# Patient Record
Sex: FEMALE | Race: WHITE
[De-identification: names, ages, dates, MRNs, and addresses within clinical notes are randomized per-mention and may not be internally consistent; named-entity substitution may affect disease eponyms.]

---

## 2019-08-03 ENCOUNTER — HOSPITAL ENCOUNTER (OUTPATIENT)
Age: 59
Discharge: HOME | End: 2019-08-03
Payer: COMMERCIAL

## 2020-04-10 ENCOUNTER — HOSPITAL ENCOUNTER (EMERGENCY)
Dept: HOSPITAL 76 - ED | Age: 60
Discharge: HOME | End: 2020-04-10
Payer: COMMERCIAL

## 2020-04-10 VITALS — DIASTOLIC BLOOD PRESSURE: 65 MMHG | SYSTOLIC BLOOD PRESSURE: 115 MMHG
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
## 2020-04-10 DIAGNOSIS — S82.831A: Primary | ICD-10-CM

## 2020-04-10 DIAGNOSIS — F17.210: ICD-10-CM

## 2020-04-10 DIAGNOSIS — I10: ICD-10-CM

## 2020-04-10 PROCEDURE — 29505 APPLICATION LONG LEG SPLINT: CPT

## 2020-04-10 PROCEDURE — 73610 X-RAY EXAM OF ANKLE: CPT

## 2020-04-10 PROCEDURE — 99283 EMERGENCY DEPT VISIT LOW MDM: CPT

## 2020-04-10 PROCEDURE — 73590 X-RAY EXAM OF LOWER LEG: CPT

## 2020-04-10 RX ADMIN — HYDROCODONE BITARTRATE AND ACETAMINOPHEN STA TAB: 5; 325 TABLET ORAL at 02:46

## 2020-04-10 NOTE — XRAY REPORT
Reason:  pain

Procedure Date:  04/10/2020   

Accession Number:  [id number] / [id number]                    

Procedure:  XR  - Ankle 3 View RT CPT Code:  

 

***Final Report***

 

 

FULL RESULT:

 

 

EXAM:

RIGHT TIBIA/FIBULA AND ANKLE RADIOGRAPHY

 

EXAM DATE: 4/10/2020 02:23 AM

 

CLINICAL HISTORY: Right lower leg and ankle pain, injury.

 

COMPARISON: LEG LOWER RT 04/10/2020 2:01 AM.

 

TECHNIQUE: 3 views of the ankle and 2 views of the lower leg.

 

FINDINGS:

Bones: Nondisplaced oblique fracture of the right proximal fibular 

metaphysis. No acute fracture in the ankle identified. Possible distal 

fibular remote fracture.

 

Joints: No knee or ankle malalignment.

 

Soft Tissues: Normal. No soft tissue swelling.

IMPRESSION:

1. Nondisplaced oblique fracture of the right proximal fibular metaphysis.

2. No acute fracture seen in the ankle.

 

[facility]

## 2020-04-10 NOTE — XRAY REPORT
Reason:  pain

Procedure Date:  04/10/2020   

Accession Number:  [id number] / [id number]                    

Procedure:  XR  - Tib/Fib RT CPT Code:  

 

***Final Report***

 

 

FULL RESULT:

 

 

EXAM:

RIGHT TIBIA/FIBULA AND ANKLE RADIOGRAPHY

 

EXAM DATE: 4/10/2020 02:23 AM

 

CLINICAL HISTORY: Right lower leg and ankle pain, injury.

 

COMPARISON: LEG LOWER RT 04/10/2020 2:01 AM.

 

TECHNIQUE: 3 views of the ankle and 2 views of the lower leg.

 

FINDINGS:

Bones: Nondisplaced oblique fracture of the right proximal fibular 

metaphysis. No acute fracture in the ankle identified. Possible distal 

fibular remote fracture.

 

Joints: No knee or ankle malalignment.

 

Soft Tissues: Normal. No soft tissue swelling.

IMPRESSION:

1. Nondisplaced oblique fracture of the right proximal fibular metaphysis.

2. No acute fracture seen in the ankle.

 

[facility]

## 2020-04-10 NOTE — ED PHYSICIAN DOCUMENTATION
History of Present Illness





- Stated complaint


Stated Complaint: RT LEG PX/FALL





- Chief complaint


Chief Complaint: General





- History obtained from


History obtained from: Patient (Patient is a 59-year-old female who tripped and 

fell and landed on her right lower extremity she is complaining of right lower 

extremity pain reports she is unable to bear weight.  Denies any head or neck 

pain denies any loss of consciousness.)





Review of Systems


Constitutional: reports: Reviewed and negative


Eyes: reports: Reviewed and negative


Ears: reports: Reviewed and negative


Nose: reports: Reviewed and negative


Throat: reports: Reviewed and negative


Cardiac: reports: Reviewed and negative


Respiratory: reports: Reviewed and negative


GI: reports: Reviewed and negative


: reports: Reviewed and negative


Skin: reports: Reviewed and negative


Musculoskeletal: reports: Extremity pain


Neurologic: reports: Reviewed and negative


Psychiatric: reports: Reviewed and negative


Endocrine: reports: Reviewed and negative


Immunocompromised: reports: Reviewed and negative





PD PAST MEDICAL HISTORY





- Past Medical History


Past Medical History: Yes


Cardiovascular: None, Hypertension


Respiratory: None





- Past Surgical History


Past Surgical History: Yes


/GYN: Hysterectomy





- Present Medications


Home Medications: 


                                Ambulatory Orders











 Medication  Instructions  Recorded  Confirmed


 


Dicyclomine HCl [Bentyl] 20 mg PO Q6H PRN #20 tablet 03/26/16 


 


Hydrocodone/Acetaminophen [Norco 1 each PO Q6H PRN #20 tablet 03/26/16 





5-325 Tablet]   


 


amLODIPine [Norvasc] 5 mg PO DAILY 03/26/16 03/26/16


 


Hydrocodone/Acetaminophen [Norco 1 each PO Q6HR PRN #10 tablet 04/10/20 





5-325 Tablet]   


 


Ondansetron Odt [Zofran Odt] 4 mg TL Q6H PRN #10 tablet 04/10/20 














- Allergies


Allergies/Adverse Reactions: 


                                    Allergies











Allergy/AdvReac Type Severity Reaction Status Date / Time


 


No Known Drug Allergies Allergy   Verified 04/10/20 01:46














- Social History


Does the pt smoke?: Yes


Smoking Status: Current every day smoker


Does the pt drink ETOH?: No


Does the pt have substance abuse?: No





- POLST


Patient has POLST: No





PD ED PE NORMAL





- Vitals


Vital signs reviewed: Yes





- General


General: Alert and oriented X 3, No acute distress, Well developed/nourished





- HEENT


HEENT: Atraumatic, PERRL, Moist mucous membranes





- Neck


Neck: Supple, no meningeal sign





- Cardiac


Cardiac: RRR, No murmur, Strong equal pulses





- Respiratory


Respiratory: No respiratory distress, Clear bilaterally





- Abdomen


Abdomen: Normal bowel sounds, Soft, Non tender, Non distended





- Back


Back: No CVA TTP, No spinal TTP





- Derm


Derm: Normal color, Warm and dry, No rash





- Extremities


Extremities: No deformity, Other (Tenderness over the right proximal fibular 

head no gross deformity compartments are soft palpable DP and PT pulses cap 

refill less than 2 seconds sensations intact to light touch unable to bear 

weight)





- Neuro


Neuro: Alert and oriented X 3, CNs 2-12 intact, No motor deficit, No sensory 

deficit, Normal speech





- Psych


Psych: Normal mood, Normal affect





Results





- Vitals


Vitals: 


                               Vital Signs - 24 hr











  04/10/20 04/10/20 04/10/20





  01:46 01:59 02:04


 


Temperature 37 C  


 


Heart Rate 79  


 


Respiratory 16 18 18





Rate   


 


Blood Pressure 115/65  


 


O2 Saturation 94  














  04/10/20





  03:11


 


Temperature 


 


Heart Rate 


 


Respiratory 17





Rate 


 


Blood Pressure 


 


O2 Saturation 








                                     Oxygen











O2 Source                      Room air

















Procedures





- Splint (location)


  ** right


Splint applied by: Tech


Type of splint: Fiberglass, Long leg, Posterior


Other: Patient tolerated well, No complications, Neurovascular intact, Good 

alignment, Crutches provided





PD MEDICAL DECISION MAKING





- ED course


Complexity details: considered differential (History and exam are concerning for

right proximal fibular head fracture.), d/w patient, other (Patient reexamined 

and updated on her imaging imaging confirms suspicion of fibular head injury it 

shows a Right proximal fibular head nondisplaced oblique fracture.Patient will 

be immobilized in Ortho-Glass splint posterior and kept nonweightbearing until 

she follows up with orthopedic surgery crutches provided for the patient)





Departure





- Departure


Disposition: 01 Home, Self Care


Clinical Impression: 


Fibula fracture


Qualifiers:


 Encounter type: initial encounter Fibula location: proximal Fracture type: 

closed Fracture morphology: other fracture Laterality: right Qualified Code(s): 

S82.831A - Other fracture of upper and lower end of right fibula, initial 

encounter for closed fracture





Condition: Stable


Instructions:  ED Fx Lower Ext


Follow-Up: 


Lori Chandra ARNP, NP-C [Primary Care Provider] - 


[facility] [Provider Group] - 04/10/20


Prescriptions: 


Hydrocodone/Acetaminophen [Norco 5-325 Tablet] 1 each PO Q6HR PRN #10 tablet


 PRN Reason: Pain


Ondansetron Odt [Zofran Odt] 4 mg TL Q6H PRN #10 tablet


 PRN Reason: Nausea / Vomiting


Comments: 


call ortho today to schedule a follow up, use crutches and splint until 

evaluated by orthopedic surgeon or your primary care provider.


Discharge Date/Time: 04/10/20 03:12

## 2020-05-18 ENCOUNTER — HOSPITAL ENCOUNTER (OUTPATIENT)
Dept: HOSPITAL 76 - DI.WCP | Age: 60
Discharge: HOME | End: 2020-05-18
Attending: ORTHOPAEDIC SURGERY
Payer: COMMERCIAL

## 2020-05-18 DIAGNOSIS — S82.831D: Primary | ICD-10-CM

## 2020-05-19 NOTE — XRAY REPORT
Reason:  RIGHT PROXIMAL TIBIA FRACTURE

Procedure Date:  05/18/2020   

Accession Number:  [id number] / [id number]                    

Procedure:  WCP - Tib/Fib RT CPT Code:  

 

***Final Report***

 

 

FULL RESULT:

 

 

EXAM:

RIGHT TIBIA/FIBULA RADIOGRAPHY

 

EXAM DATE: 5/18/2020 10:12 AM.

 

CLINICAL HISTORY: RIGHT PROXIMAL TIBIA FRACTURE.

 

COMPARISON: LEG LOWER RT 04/10/2020 2:01 AM.

 

TECHNIQUE: 2 views.

 

FINDINGS:

Bones: Right fibular neck fracture with some interval callus formation. 

Fracture line remains visible. No significant displacement.

 

No additional fracture demonstrated.

 

Joints: The visualized knee and ankle joints are normal. No effusions.

 

Soft Tissues: Normal. No soft tissue swelling.

IMPRESSION: Partial healing of right fibular neck fracture.

 

[facility]IA

## 2023-06-22 ENCOUNTER — HOSPITAL ENCOUNTER (OUTPATIENT)
Dept: HOSPITAL 76 - EMS | Age: 63
Discharge: LEFT BEFORE DISCHARGE | End: 2023-06-22
Payer: COMMERCIAL

## 2023-06-22 ENCOUNTER — HOSPITAL ENCOUNTER (EMERGENCY)
Dept: HOSPITAL 76 - ED | Age: 63
Discharge: HOME | End: 2023-06-22
Payer: COMMERCIAL

## 2023-06-22 VITALS — SYSTOLIC BLOOD PRESSURE: 131 MMHG | DIASTOLIC BLOOD PRESSURE: 66 MMHG

## 2023-06-22 DIAGNOSIS — R06.02: Primary | ICD-10-CM

## 2023-06-22 DIAGNOSIS — R23.2: ICD-10-CM

## 2023-06-22 DIAGNOSIS — R61: ICD-10-CM

## 2023-06-22 DIAGNOSIS — R05.1: Primary | ICD-10-CM

## 2023-06-22 DIAGNOSIS — I10: ICD-10-CM

## 2023-06-22 DIAGNOSIS — R42: ICD-10-CM

## 2023-06-22 LAB
ALBUMIN DIAFP-MCNC: 4.5 G/DL (ref 3.2–5.5)
ALBUMIN/GLOB SERPL: 1.2 {RATIO} (ref 1–2.2)
ALP SERPL-CCNC: 58 IU/L (ref 42–121)
ALT SERPL W P-5'-P-CCNC: 16 IU/L (ref 10–60)
ANION GAP SERPL CALCULATED.4IONS-SCNC: 8 MMOL/L (ref 6–13)
AST SERPL W P-5'-P-CCNC: 21 IU/L (ref 10–42)
BASOPHILS NFR BLD AUTO: 0.1 10^3/UL (ref 0–0.1)
BASOPHILS NFR BLD AUTO: 0.7 %
BILIRUB BLD-MCNC: 0.5 MG/DL (ref 0.2–1)
BILIRUB UR QL CFM: NEGATIVE
BUN SERPL-MCNC: 14 MG/DL (ref 6–20)
CALCIUM UR-MCNC: 9.4 MG/DL (ref 8.5–10.3)
CASTS URNS QL MICRO: (no result) /LPF
CHLORIDE SERPL-SCNC: 104 MMOL/L (ref 101–111)
CLARITY UR REFRACT.AUTO: CLEAR
CO2 SERPL-SCNC: 26 MMOL/L (ref 21–32)
CREAT SERPLBLD-SCNC: 0.8 MG/DL (ref 0.4–1)
EOSINOPHIL # BLD AUTO: 0.1 10^3/UL (ref 0–0.7)
EOSINOPHIL NFR BLD AUTO: 0.6 %
ERYTHROCYTE [DISTWIDTH] IN BLOOD BY AUTOMATED COUNT: 12.1 % (ref 12–15)
GFRSERPLBLD MDRD-ARVRAT: 73 ML/MIN/{1.73_M2} (ref 89–?)
GLOBULIN SER-MCNC: 3.8 G/DL (ref 2.1–4.2)
GLUCOSE SERPL-MCNC: 98 MG/DL (ref 70–100)
GLUCOSE UR QL STRIP.AUTO: NEGATIVE MG/DL
HCT VFR BLD AUTO: 43.9 % (ref 37–47)
HGB UR QL STRIP: 14.7 G/DL (ref 12–16)
KETONES UR QL STRIP.AUTO: (no result) MG/DL
LIPASE SERPL-CCNC: 36 U/L (ref 22–51)
LYMPHOCYTES # SPEC AUTO: 2 10^3/UL (ref 1.5–3.5)
LYMPHOCYTES NFR BLD AUTO: 22.7 %
MCH RBC QN AUTO: 32 PG (ref 27–31)
MCHC RBC AUTO-ENTMCNC: 33.5 G/DL (ref 32–36)
MCV RBC AUTO: 95.4 FL (ref 81–99)
MONOCYTES # BLD AUTO: 0.8 10^3/UL (ref 0–1)
MONOCYTES NFR BLD AUTO: 9.3 %
MUCOUS THREADS URNS QL MICRO: (no result)
NEUTROPHILS # BLD AUTO: 5.7 10^3/UL (ref 1.5–6.6)
NEUTROPHILS # SNV AUTO: 8.6 X10^3/UL (ref 4.8–10.8)
NEUTROPHILS NFR BLD AUTO: 66.4 %
NITRITE UR QL STRIP.AUTO: NEGATIVE
NRBC # BLD AUTO: 0 /100WBC
NRBC # BLD AUTO: 0 X10^3/UL
PDW BLD AUTO: 8.4 FL (ref 7.9–10.8)
PH UR STRIP.AUTO: 5 PH (ref 5–7.5)
PLATELET # BLD: 299 10^3/UL (ref 130–450)
POTASSIUM SERPL-SCNC: 4.2 MMOL/L (ref 3.5–5)
PROT SPEC-MCNC: 8.3 G/DL (ref 6.7–8.2)
PROT UR STRIP.AUTO-MCNC: 100 MG/DL
RBC # UR STRIP.AUTO: NEGATIVE /UL
RBC # URNS HPF: (no result) /HPF (ref 0–5)
RBC MAR: 4.6 10^6/UL (ref 4.2–5.4)
SODIUM SERPLBLD-SCNC: 138 MMOL/L (ref 135–145)
SP GR UR STRIP.AUTO: >=1.03 (ref 1–1.03)
SQUAMOUS URNS QL MICRO: (no result)
UROBILINOGEN UR QL STRIP.AUTO: (no result) E.U./DL
UROBILINOGEN UR STRIP.AUTO-MCNC: NEGATIVE MG/DL
WBC # UR MANUAL: (no result) /HPF (ref 0–5)

## 2023-06-22 PROCEDURE — 83690 ASSAY OF LIPASE: CPT

## 2023-06-22 PROCEDURE — 81003 URINALYSIS AUTO W/O SCOPE: CPT

## 2023-06-22 PROCEDURE — 36415 COLL VENOUS BLD VENIPUNCTURE: CPT

## 2023-06-22 PROCEDURE — 99283 EMERGENCY DEPT VISIT LOW MDM: CPT

## 2023-06-22 PROCEDURE — 85025 COMPLETE CBC W/AUTO DIFF WBC: CPT

## 2023-06-22 PROCEDURE — 81001 URINALYSIS AUTO W/SCOPE: CPT

## 2023-06-22 PROCEDURE — 84484 ASSAY OF TROPONIN QUANT: CPT

## 2023-06-22 PROCEDURE — 93005 ELECTROCARDIOGRAM TRACING: CPT

## 2023-06-22 PROCEDURE — 80053 COMPREHEN METABOLIC PANEL: CPT

## 2023-06-22 PROCEDURE — 99284 EMERGENCY DEPT VISIT MOD MDM: CPT

## 2023-06-22 PROCEDURE — 87086 URINE CULTURE/COLONY COUNT: CPT

## 2023-06-22 NOTE — XRAY REPORT
PROCEDURE:  Chest 1 View X-Ray

 

INDICATIONS:  Chest Pain

 

TECHNIQUE:  One view of the chest was acquired.  

 

COMPARISON:  Chest x-ray 3/26/2016

 

FINDINGS:  

 

Surgical changes and devices:  Left axillary clips.

 

Lungs and pleura:  No pleural effusions or pneumothorax.  Lungs are clear.   Lungs are hyperexpanded 
suggestive of COPD.

 

Mediastinum:  Mediastinal contours appear normal.  Heart size is normal.  

 

Bones and chest wall:  No suspicious bony lesions.  Overlying soft tissues appear unremarkable.  

 

 

IMPRESSION:  

 

No acute cardiopulmonary process.

 

 

 

Reviewed by: Alexsandra Ferreira MD on 6/22/2023 12:08 PM PDT

Approved by: Alexsandra Ferreira MD on 6/22/2023 12:08 PM PDT

 

 

Station ID:  [id number]
Wound dehiscence
Wound dehiscence

## 2023-06-22 NOTE — ED PHYSICIAN DOCUMENTATION
History of Present Illness





- Stated complaint


Stated Complaint: BAUTISTAA/DINA





- Chief complaint


Chief Complaint: Resp





- Additonal information


Additional information: 





62-year-old female presents the emergency department for evaluation of feeling 

generally unwell.  States that about 3 days ago she began having some dry cough 

congestion and thought she had a head cold but this morning she developed sudden

onset rigors and had a difficult time breathing.





She states 12 years ago she had a hypertensive crisis that resulted in a heart 

attack.  Her symptoms today were similar to then.





Patient is a daily tobacco user 1 pack/day.  She also drinks about a sixpack of 

beer daily.  She denies any fevers, chest pain, nausea or vomiting.  No diarrhea

or urinary symptoms.  She takes amlodipine only and is followed by BETHANY Whitfield.





Review of Systems


Constitutional: denies: Fever, Myalgias


Nose: reports: Congestion


Throat: reports: Reviewed and negative


Cardiac: denies: Chest pain / pressure, Palpitations, Pedal edema, Calf pain


Respiratory: reports: Dyspnea, Cough


GI: reports: Reviewed and negative


Skin: reports: Reviewed and negative


Musculoskeletal: reports: Reviewed and negative


Neurologic: reports: Reviewed and negative


Psychiatric: reports: Reviewed and negative





PD PAST MEDICAL HISTORY





- Past Medical History


Cardiovascular: None, Hypertension


Respiratory: None





- Past Surgical History


Past Surgical History: Yes


/GYN: Hysterectomy





- Present Medications


Home Medications: 


                                Ambulatory Orders











 Medication  Instructions  Recorded  Confirmed


 


Dicyclomine HCl [Bentyl] 20 mg PO Q6H PRN #20 tablet 03/26/16 


 


Hydrocodone/Acetaminophen [Norco 1 each PO Q6H PRN #20 tablet 03/26/16 





5-325 Tablet]   


 


amLODIPine [Norvasc] 5 mg PO DAILY 03/26/16 03/26/16


 


Hydrocodone/Acetaminophen [Norco 1 each PO Q6HR PRN #10 tablet 04/10/20 





5-325 Tablet]   


 


Ondansetron Odt [Zofran Odt] 4 mg TL Q6H PRN #10 tablet 04/10/20 














- Allergies


Allergies/Adverse Reactions: 


                                    Allergies











Allergy/AdvReac Type Severity Reaction Status Date / Time


 


No Known Drug Allergies Allergy   Verified 06/22/23 10:55














- Social History


Does the pt smoke?: Yes


Smoking Status: Current every day smoker


Does the pt drink ETOH?: No


Does the pt have substance abuse?: No





- POLST


Patient has POLST: No





PD ED PE NORMAL





- General


General: Alert and oriented X 3, Well developed/nourished





- HEENT


HEENT: Atraumatic





- Neck


Neck: Supple, no meningeal sign





- Cardiac


Cardiac: RRR (Normal sinus rhythm on the monitor.), No murmur, Strong equal 

pulses





- Respiratory


Respiratory: No respiratory distress, Clear bilaterally





- Abdomen


Abdomen: Normal bowel sounds, Soft, Non tender, Non distended





- Back


Back: No CVA TTP





- Derm


Derm: Normal color, Warm and dry, No rash





- Extremities


Extremities: No deformity, Normal ROM s pain





- Neuro


Neuro: Alert and oriented X 3, CNs 2-12 intact


Eye Opening: Spontaneous


Motor: Obeys Commands


Verbal: Oriented


GCS Score: 15





- Psych


Psych: Normal mood





Results





- Vitals


Vitals: 


                               Vital Signs - 24 hr











  06/22/23 06/22/23





  10:52 12:24


 


Temperature 36.6 C 


 


Heart Rate 90 79


 


Respiratory 20 22





Rate  


 


Blood Pressure 131/76 H 131/66 H


 


O2 Saturation 96 97








                                     Oxygen











O2 Source                      Room air


 


Oxygen Flow Rate               2

















- EKG (time done)


  ** 1145


EKG releavant findings:: 


EKG personally interpreted by author of this note. Relevant findings are: 





Rate: Rate (enter#) (77)


Rhythm: NSR


Axis: Normal


Intervals: Normal DE.  No: Prolonged QT


QRS: Normal


Ischemia: Normal ST segments


Compare to prior EKG: Old EKG unavailable


Computer interpretation: Disagree with computer





- Labs


Labs: 


                                Laboratory Tests











  06/22/23 06/22/23 06/22/23





  12:04 12:04 12:04


 


WBC  8.6  


 


RBC  4.60  


 


Hgb  14.7  


 


Hct  43.9  


 


MCV  95.4  


 


MCH  32.0 H  


 


MCHC  33.5  


 


RDW  12.1  


 


Plt Count  299  


 


MPV  8.4  


 


Neut # (Auto)  5.7  


 


Lymph # (Auto)  2.0  


 


Mono # (Auto)  0.8  


 


Eos # (Auto)  0.1  


 


Baso # (Auto)  0.1  


 


Absolute Nucleated RBC  0.00  


 


Nucleated RBC %  0.0  


 


Sodium   138 


 


Potassium   4.2 


 


Chloride   104 


 


Carbon Dioxide   26 


 


Anion Gap   8.0 


 


BUN   14 


 


Creatinine   0.8 


 


Estimated GFR (MDRD)   73 L 


 


Glucose   98 


 


Calcium   9.4 


 


Total Bilirubin   0.5 


 


AST   21 


 


ALT   16 


 


Alkaline Phosphatase   58 


 


Troponin I High Sens    < 2.3 L


 


Total Protein   8.3 H 


 


Albumin   4.5 


 


Globulin   3.8 


 


Albumin/Globulin Ratio   1.2 


 


Lipase   36 


 


Urine Color   


 


Urine Clarity   


 


Urine pH   


 


Ur Specific Gravity   


 


Urine Protein   


 


Urine Glucose (UA)   


 


Urine Ketones   


 


Urine Occult Blood   


 


Urine Nitrite   


 


Urine Bilirubin   


 


Urine Urobilinogen   


 


Ur Leukocyte Esterase   


 


Urine RBC   


 


Urine WBC   


 


Ur Squamous Epith Cells   


 


Urine Bacteria   


 


Urine Casts   


 


Urine Mucus   


 


Ur Microscopic Review   


 


Urine Culture Comments   














  06/22/23





  12:28


 


WBC 


 


RBC 


 


Hgb 


 


Hct 


 


MCV 


 


MCH 


 


MCHC 


 


RDW 


 


Plt Count 


 


MPV 


 


Neut # (Auto) 


 


Lymph # (Auto) 


 


Mono # (Auto) 


 


Eos # (Auto) 


 


Baso # (Auto) 


 


Absolute Nucleated RBC 


 


Nucleated RBC % 


 


Sodium 


 


Potassium 


 


Chloride 


 


Carbon Dioxide 


 


Anion Gap 


 


BUN 


 


Creatinine 


 


Estimated GFR (MDRD) 


 


Glucose 


 


Calcium 


 


Total Bilirubin 


 


AST 


 


ALT 


 


Alkaline Phosphatase 


 


Troponin I High Sens 


 


Total Protein 


 


Albumin 


 


Globulin 


 


Albumin/Globulin Ratio 


 


Lipase 


 


Urine Color  DARK YELLOW


 


Urine Clarity  CLEAR


 


Urine pH  5.0


 


Ur Specific Gravity  >=1.030 H


 


Urine Protein  100 H


 


Urine Glucose (UA)  NEGATIVE


 


Urine Ketones  TRACE


 


Urine Occult Blood  NEGATIVE


 


Urine Nitrite  NEGATIVE


 


Urine Bilirubin  NEGATIVE


 


Urine Urobilinogen  0.2 (NORMAL)


 


Ur Leukocyte Esterase  NEGATIVE


 


Urine RBC  0-5


 


Urine WBC  0-3


 


Ur Squamous Epith Cells  FEW Squamous


 


Urine Bacteria  Rare


 


Urine Casts  3-5 Hyaline Casts


 


Urine Mucus  Few Strands


 


Ur Microscopic Review  INDICATED


 


Urine Culture Comments  NOT INDICATED














- Rads (name of study)


  ** cxr


Relevant Findings:: Final report received (No acute cardiopulmonary process)





PD Medical Decision Making





- ED course


Complexity details: reviewed results, re-evaluated patient, d/w patient


ED course: 





Well-appearing 62-year-old female presents emergency department for feeling 

generally unwell for the last several days.  She has had some cough and 

congestion.  Later this morning she began to have some generalized shakiness and

felt short of air.  She reports that the symptoms were similar to when she had a

heart attack due to hypertensive urgency about 12 years ago.





On presentation to the emergency department she is alert and well-appearing.  

She has no respiratory distress.  She is afebrile and normotensive with a blood 

pressure of 120/76.  EKG shows sinus rhythm.





Her cardiopulmonary auscultation was unremarkable.  I did obtain a chest x-ray 

which showed no signs of pneumonia, pneumothorax, pleural effusion or 

cardiomegaly.  CBC, electrolytes and troponin were all without acute worrisome 

findings per my interpretation.  Her EKG was also sinus rhythm.  Clinically 

history and exam is not consistent with a hypertensive emergency, ACS.  I do 

suspect she has a mild viral URI.  Patient does drink and smoke heavily.  I have

encouraged her to discuss these concerns with her PCP though clinically the 

patient does not appear to have signs of withdrawal.  She is discharged home in 

stable condition with usual emergent return precautions discussed.





Departure





- Departure


Disposition: 01 Home, Self Care


Clinical Impression: 


Cough


Qualifiers:


 Cough type: acute Qualified Code(s): R05.1 - Acute cough





Condition: Stable


Record reviewed to determine appropriate education?: Yes


Comments: 


As discussed at the bedside here in the emergency department today your vital 

signs have been normal.  Your chest x-ray did not show any findings of pneumonia

or other worrisome features.  Your labs, troponin and EKG were all normal.


I am I suspect that the cause of your symptoms is likely a virus or head cold.  

In general I recommend that you get plenty of rest and drink lots of liquids.  

You can take any over-the-counter cough medications if you feel that they would 

be helpful.  I encourage you to think about your tobacco and alcohol use.





Return to the ER if you develop any sudden severe chest pain, have shortness of 

air or fainting episodes.  As always discussed this ED visit with your primary 

care doctor.

## 2023-07-17 ENCOUNTER — HOSPITAL ENCOUNTER (OUTPATIENT)
Age: 63
End: 2023-07-17
Payer: COMMERCIAL

## 2023-07-17 DIAGNOSIS — Z90.710: ICD-10-CM

## 2023-07-17 DIAGNOSIS — R10.9: Primary | ICD-10-CM

## 2023-07-17 PROCEDURE — 76856 US EXAM PELVIC COMPLETE: CPT

## 2023-07-17 PROCEDURE — 76705 ECHO EXAM OF ABDOMEN: CPT

## 2023-07-17 PROCEDURE — 76830 TRANSVAGINAL US NON-OB: CPT
